# Patient Record
Sex: FEMALE | Race: BLACK OR AFRICAN AMERICAN | NOT HISPANIC OR LATINO | Employment: STUDENT | URBAN - NONMETROPOLITAN AREA
[De-identification: names, ages, dates, MRNs, and addresses within clinical notes are randomized per-mention and may not be internally consistent; named-entity substitution may affect disease eponyms.]

---
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
## 2025-01-10 ENCOUNTER — OFFICE VISIT (OUTPATIENT)
Dept: URGENT CARE | Facility: CLINIC | Age: 20
End: 2025-01-10
Payer: MEDICAID

## 2025-01-10 VITALS
HEIGHT: 64 IN | WEIGHT: 135.69 LBS | TEMPERATURE: 99 F | HEART RATE: 77 BPM | OXYGEN SATURATION: 99 % | DIASTOLIC BLOOD PRESSURE: 69 MMHG | SYSTOLIC BLOOD PRESSURE: 105 MMHG | RESPIRATION RATE: 17 BRPM | BODY MASS INDEX: 23.17 KG/M2

## 2025-01-10 DIAGNOSIS — N89.8 VAGINAL DISCHARGE: Primary | ICD-10-CM

## 2025-01-10 PROCEDURE — 81515 NFCT DS BV&VAGINITIS DNA ALG: CPT

## 2025-01-10 RX ORDER — TRIAMCINOLONE ACETONIDE 1 MG/G
CREAM TOPICAL
COMMUNITY
Start: 2025-01-02

## 2025-01-10 RX ORDER — ALBUTEROL SULFATE 0.83 MG/ML
SOLUTION RESPIRATORY (INHALATION)
COMMUNITY

## 2025-01-10 NOTE — PROGRESS NOTES
"Subjective:      Patient ID: Sebastien Rodriguez is a 19 y.o. female.    Vitals:  height is 5' 4.29" (1.633 m) and weight is 61.5 kg (135 lb 11.1 oz). Her oral temperature is 98.6 °F (37 °C). Her blood pressure is 105/69 and her pulse is 77. Her respiration is 17 and oxygen saturation is 99%.     Chief Complaint: Exposure to STD    Pt presents to clinic with her girlfriend who is also being seen in clinic. Pt is sexually active with one female partner. Denies vaginal discharge, itching, rash, symptoms. Pt stated she would like to be tested for whatever her girlfriend in clinic is tested for.     Exposure to STD   The patient's pertinent negatives include no discharge, dyspareunia, dysuria, genital itching, genital lesions, genital rash, genital warts or pelvic pain. This is a new problem. The patient is experiencing no pain.She reports no condom usage. She has received the HPV vaccine. The vaginal discharge was normal. Associated symptoms include diarrhea. Pertinent negatives include no abdominal pain, fatigue, fever, flank pain, genital odor, headaches, hesitancy, nausea, rectal pain, sore throat, vertigo or vomiting. The symptoms are aggravated by: nothing.She has tried nothing for the symptoms. The treatment provided no relief. Risk factors: none.       Constitution: Negative for fatigue and fever.   HENT:  Negative for sore throat.    Gastrointestinal:  Positive for diarrhea. Negative for abdominal pain, nausea, vomiting and rectal pain.   Genitourinary:  Negative for dysuria, flank pain and pelvic pain.   Neurological:  Negative for history of vertigo and headaches.      Objective:     Physical Exam   Constitutional:  Non-toxic appearance. She does not appear ill. No distress.   HENT:   Head: Normocephalic and atraumatic.   Ears:   Right Ear: External ear normal.   Left Ear: External ear normal.   Nose: Nose normal.   Mouth/Throat: Mucous membranes are moist.   Eyes: Conjunctivae are normal. "   Pulmonary/Chest: Effort normal. No respiratory distress.   Abdominal: Normal appearance.   Genitourinary:         Comments:  exam Deferred by patient.      Neurological: She is alert.   Skin: Skin is warm, dry and not diaphoretic.   Psychiatric: Her behavior is normal. Judgment and thought content normal.   Nursing note and vitals reviewed.      Assessment:     1. Vaginal discharge        Plan:       Vaginal discharge  -     Vaginosis Screen by DNA Probe        Discussed with patient we will call with results.   Discussed with patient the importance of f/u with their primary care provider. Urged to go to the ER for any worsening signs or symptoms.

## 2025-01-10 NOTE — LETTER
January 10, 2025      [facility]  [address]  [place] [address]  Phone: [phone]  Fax: [phone]       Patient: Sebastien Rodriguez   YOB: 2005  Date of Visit: 01/10/2025    To Whom It May Concern:    Leticia Rodriguez  was at [facility] on 01/10/2025. The patient may return to work/school on 1/11/25 with no restrictions. If you have any questions or concerns, or if I can be of further assistance, please do not hesitate to contact me.    Sincerely,    Judi Hancock PA-C

## 2025-01-13 ENCOUNTER — TELEPHONE (OUTPATIENT)
Dept: URGENT CARE | Facility: CLINIC | Age: 20
End: 2025-01-13
Payer: MEDICAID

## 2025-01-13 NOTE — TELEPHONE ENCOUNTER
Attempted to contact patient discussed lab results.  Positive for Candida.  No answer.  Left message to return call

## 2025-01-14 ENCOUNTER — TELEPHONE (OUTPATIENT)
Dept: URGENT CARE | Facility: CLINIC | Age: 20
End: 2025-01-14
Payer: MEDICAID

## 2025-01-14 DIAGNOSIS — B37.9 YEAST INFECTION: Primary | ICD-10-CM

## 2025-01-14 RX ORDER — FLUCONAZOLE 150 MG/1
TABLET ORAL
Qty: 2 TABLET | Refills: 0 | Status: SHIPPED | OUTPATIENT
Start: 2025-01-14